# Patient Record
Sex: FEMALE | Race: OTHER | NOT HISPANIC OR LATINO
[De-identification: names, ages, dates, MRNs, and addresses within clinical notes are randomized per-mention and may not be internally consistent; named-entity substitution may affect disease eponyms.]

---

## 2021-01-06 ENCOUNTER — RESULT REVIEW (OUTPATIENT)
Age: 61
End: 2021-01-06

## 2021-02-08 ENCOUNTER — OUTPATIENT (OUTPATIENT)
Dept: OUTPATIENT SERVICES | Facility: HOSPITAL | Age: 61
LOS: 1 days | End: 2021-02-08
Payer: COMMERCIAL

## 2021-02-08 VITALS
SYSTOLIC BLOOD PRESSURE: 155 MMHG | TEMPERATURE: 98 F | RESPIRATION RATE: 16 BRPM | HEIGHT: 65 IN | HEART RATE: 67 BPM | DIASTOLIC BLOOD PRESSURE: 95 MMHG | OXYGEN SATURATION: 96 % | WEIGHT: 154.98 LBS

## 2021-02-08 DIAGNOSIS — Z01.818 ENCOUNTER FOR OTHER PREPROCEDURAL EXAMINATION: ICD-10-CM

## 2021-02-08 DIAGNOSIS — I10 ESSENTIAL (PRIMARY) HYPERTENSION: ICD-10-CM

## 2021-02-08 DIAGNOSIS — D24.1 BENIGN NEOPLASM OF RIGHT BREAST: ICD-10-CM

## 2021-02-08 PROCEDURE — G0463: CPT

## 2021-02-08 NOTE — H&P PST ADULT - HISTORY OF PRESENT ILLNESS
60year old female with pmhx of hypertension presents today for presurgical testing for scheduled excision of right breast mass with needle localization on 2/17/2021.

## 2021-02-08 NOTE — H&P PST ADULT - NSANTHOSAYNRD_GEN_A_CORE
No. CRISTY screening performed.  STOP BANG Legend: 0-2 = LOW Risk; 3-4 = INTERMEDIATE Risk; 5-8 = HIGH Risk

## 2021-02-13 DIAGNOSIS — Z01.818 ENCOUNTER FOR OTHER PREPROCEDURAL EXAMINATION: ICD-10-CM

## 2021-02-13 PROBLEM — Z00.00 ENCOUNTER FOR PREVENTIVE HEALTH EXAMINATION: Status: ACTIVE | Noted: 2021-02-13

## 2021-02-14 ENCOUNTER — APPOINTMENT (OUTPATIENT)
Dept: DISASTER EMERGENCY | Facility: CLINIC | Age: 61
End: 2021-02-14

## 2021-02-15 LAB — SARS-COV-2 N GENE NPH QL NAA+PROBE: NOT DETECTED

## 2021-02-16 ENCOUNTER — TRANSCRIPTION ENCOUNTER (OUTPATIENT)
Age: 61
End: 2021-02-16

## 2021-02-17 ENCOUNTER — RESULT REVIEW (OUTPATIENT)
Age: 61
End: 2021-02-17

## 2021-02-17 ENCOUNTER — OUTPATIENT (OUTPATIENT)
Dept: OUTPATIENT SERVICES | Facility: HOSPITAL | Age: 61
LOS: 1 days | End: 2021-02-17
Payer: COMMERCIAL

## 2021-02-17 VITALS
HEART RATE: 63 BPM | SYSTOLIC BLOOD PRESSURE: 165 MMHG | OXYGEN SATURATION: 100 % | WEIGHT: 154.98 LBS | RESPIRATION RATE: 16 BRPM | TEMPERATURE: 98 F | DIASTOLIC BLOOD PRESSURE: 85 MMHG | HEIGHT: 65 IN

## 2021-02-17 VITALS
DIASTOLIC BLOOD PRESSURE: 72 MMHG | TEMPERATURE: 98 F | RESPIRATION RATE: 14 BRPM | OXYGEN SATURATION: 98 % | SYSTOLIC BLOOD PRESSURE: 136 MMHG | HEART RATE: 59 BPM

## 2021-02-17 DIAGNOSIS — D24.1 BENIGN NEOPLASM OF RIGHT BREAST: ICD-10-CM

## 2021-02-17 PROCEDURE — 88305 TISSUE EXAM BY PATHOLOGIST: CPT | Mod: 26

## 2021-02-17 PROCEDURE — 19281 PERQ DEVICE BREAST 1ST IMAG: CPT | Mod: RT

## 2021-02-17 PROCEDURE — 19281 PERQ DEVICE BREAST 1ST IMAG: CPT

## 2021-02-17 PROCEDURE — 76098 X-RAY EXAM SURGICAL SPECIMEN: CPT

## 2021-02-17 PROCEDURE — 19125 EXCISION BREAST LESION: CPT | Mod: RT

## 2021-02-17 PROCEDURE — 88305 TISSUE EXAM BY PATHOLOGIST: CPT

## 2021-02-17 PROCEDURE — 76098 X-RAY EXAM SURGICAL SPECIMEN: CPT | Mod: 26

## 2021-02-17 RX ORDER — FENTANYL CITRATE 50 UG/ML
5 INJECTION INTRAVENOUS
Qty: 0 | Refills: 0 | DISCHARGE
Start: 2021-02-17

## 2021-02-17 RX ORDER — METOPROLOL TARTRATE 50 MG
1 TABLET ORAL
Qty: 0 | Refills: 0 | DISCHARGE

## 2021-02-17 RX ORDER — ACETAMINOPHEN 500 MG
1000 TABLET ORAL ONCE
Refills: 0 | Status: DISCONTINUED | OUTPATIENT
Start: 2021-02-17 | End: 2021-02-17

## 2021-02-17 RX ORDER — OXYCODONE AND ACETAMINOPHEN 5; 325 MG/1; MG/1
1 TABLET ORAL ONCE
Refills: 0 | Status: DISCONTINUED | OUTPATIENT
Start: 2021-02-17 | End: 2021-02-17

## 2021-02-17 RX ORDER — AMLODIPINE BESYLATE 2.5 MG/1
1 TABLET ORAL
Qty: 0 | Refills: 0 | DISCHARGE

## 2021-02-17 RX ORDER — FENTANYL CITRATE 50 UG/ML
1 INJECTION INTRAVENOUS
Qty: 0 | Refills: 0 | DISCHARGE
Start: 2021-02-17

## 2021-02-17 RX ORDER — SODIUM CHLORIDE 9 MG/ML
3 INJECTION INTRAMUSCULAR; INTRAVENOUS; SUBCUTANEOUS EVERY 8 HOURS
Refills: 0 | Status: DISCONTINUED | OUTPATIENT
Start: 2021-02-17 | End: 2021-02-17

## 2021-02-17 RX ORDER — FENTANYL CITRATE 50 UG/ML
25 INJECTION INTRAVENOUS
Refills: 0 | Status: DISCONTINUED | OUTPATIENT
Start: 2021-02-17 | End: 2021-02-17

## 2021-02-17 RX ORDER — FENTANYL CITRATE 50 UG/ML
50 INJECTION INTRAVENOUS
Refills: 0 | Status: DISCONTINUED | OUTPATIENT
Start: 2021-02-17 | End: 2021-02-17

## 2021-02-17 RX ORDER — LOSARTAN POTASSIUM 100 MG/1
1 TABLET, FILM COATED ORAL
Qty: 0 | Refills: 0 | DISCHARGE

## 2021-02-17 RX ADMIN — SODIUM CHLORIDE 3 MILLILITER(S): 9 INJECTION INTRAMUSCULAR; INTRAVENOUS; SUBCUTANEOUS at 09:31

## 2021-02-17 NOTE — ASU PREOP CHECKLIST - SELECT TESTS ORDERED
BMP/CBC/PT/PTT/EKG/Results in MD note/COVID COVID negative 2/14/2021/BMP/CBC/PT/PTT/EKG/Results in MD note/COVID

## 2021-02-17 NOTE — ASU PREOP CHECKLIST - PATIENT'S PERSONAL PROPERTY REMOVED
glasses right wrist bracelet tapped preop---"not removable"--OR MARI Manuel made aware preop./glasses/jewelry/body piercing

## 2021-02-17 NOTE — ASU DISCHARGE PLAN (ADULT/PEDIATRIC) - CARE PROVIDER_API CALL
Marlo Hutchison  COLON/RECTAL SURGERY  [address] [place] [address]  Phone: [phone]  Fax: [phone]  Follow Up Time:

## 2021-02-22 LAB — SURGICAL PATHOLOGY STUDY: SIGNIFICANT CHANGE UP

## 2022-06-08 NOTE — H&P PST ADULT - MALLAMPATI CLASS
Never Class I (easy) - visualization of the soft palate, fauces, uvula, and both anterior and posterior pillars

## 2023-03-25 NOTE — ASU DISCHARGE PLAN (ADULT/PEDIATRIC) - COMMENTS
A guidewire was exchanged for a (GUIDEWIRE WHOLEY 300CM STRGT .035IN VASC PERIPH FLPY TIP) guidewire. Please call to confirm appointment

## 2023-07-01 PROBLEM — I10 ESSENTIAL (PRIMARY) HYPERTENSION: Chronic | Status: ACTIVE | Noted: 2021-02-08

## 2023-07-21 ENCOUNTER — APPOINTMENT (OUTPATIENT)
Dept: ORTHOPEDIC SURGERY | Facility: CLINIC | Age: 63
End: 2023-07-21
Payer: COMMERCIAL

## 2023-07-21 VITALS — WEIGHT: 150 LBS | HEIGHT: 64 IN | BODY MASS INDEX: 25.61 KG/M2

## 2023-07-21 DIAGNOSIS — Z78.9 OTHER SPECIFIED HEALTH STATUS: ICD-10-CM

## 2023-07-21 DIAGNOSIS — I10 ESSENTIAL (PRIMARY) HYPERTENSION: ICD-10-CM

## 2023-07-21 PROCEDURE — 99204 OFFICE O/P NEW MOD 45 MIN: CPT

## 2023-07-21 NOTE — PHYSICAL EXAM
[Left] : left foot and ankle [2+] : dorsalis pedis pulse: 2+ [Right] : right foot and ankle [NL (20)] : dorsiflexion 20 degrees [NL (40)] : plantar flexion 40 degrees [5___] : inversion 5[unfilled]/5 [4___] : eversion 4[unfilled]/5 [] : no achilles tendon insertion tenderness

## 2023-07-21 NOTE — HISTORY OF PRESENT ILLNESS
[7] : 7 [0] : 0 [Dull/Aching] : dull/aching [de-identified] : 7/21/23: B/L ankle pain L>R for years. She denies injury.  L heel pain and medial foot pain occurrs with walking and can shoot up the leg. There is right lateral foot pain. Has had CSI and orthotics without relief. She trialed PT without relief. She saw Dr Kidd where MRI's ordered. [] : no [FreeTextEntry1] : B/L Feet  [FreeTextEntry5] : Pt is c/o many years of atraumatic bilateral feet pain L>R Pt had MRI of Both ankle s@ [facility] as per PCP

## 2023-09-08 ENCOUNTER — APPOINTMENT (OUTPATIENT)
Dept: ORTHOPEDIC SURGERY | Facility: CLINIC | Age: 63
End: 2023-09-08
Payer: COMMERCIAL

## 2023-09-08 VITALS — HEIGHT: 64 IN | BODY MASS INDEX: 25.61 KG/M2 | WEIGHT: 150 LBS

## 2023-09-08 DIAGNOSIS — S86.311A STRAIN OF MUSCLE(S) AND TENDON(S) OF PERONEAL MUSCLE GROUP AT LOWER LEG LEVEL, RIGHT LEG, INITIAL ENCOUNTER: ICD-10-CM

## 2023-09-08 DIAGNOSIS — M72.2 PLANTAR FASCIAL FIBROMATOSIS: ICD-10-CM

## 2023-09-08 PROCEDURE — 99213 OFFICE O/P EST LOW 20 MIN: CPT

## 2023-09-08 NOTE — PHYSICAL EXAM
[Left] : left foot and ankle [2+] : dorsalis pedis pulse: 2+ [Right] : right foot and ankle [NL (20)] : dorsiflexion 20 degrees [NL (40)] : plantar flexion 40 degrees [5___] : inversion 5[unfilled]/5 [4___] : eversion 4[unfilled]/5 [] : no achilles tendon insertion tenderness [FreeTextEntry8] : minimal

## 2023-09-08 NOTE — HISTORY OF PRESENT ILLNESS
[7] : 7 [0] : 0 [Dull/Aching] : dull/aching [de-identified] : 7/21/23: B/L ankle pain L>R for years. She denies injury.  L heel pain and medial foot pain occurs with walking and can shoot up the leg. There is right lateral foot pain. Has had CSI and orthotics without relief. She trialed PT without relief. She saw Dr Kidd where MRI's ordered. 9/8/23  f/u alaina foot pain 50-60% improved  HEP helpful [] : no [FreeTextEntry1] : B/L Feet  [FreeTextEntry5] : Pt is c/o many years of atraumatic bilateral feet pain L>R Pt had MRI of Both ankle s@ [facility] as per PCP. Pt states condition has slightly improved since last visit

## 2024-05-16 NOTE — DATA REVIEWED
MRI] : MRI [Ankle] : ankle [I independently reviewed and interpreted images and report] : I independently reviewed and interpreted images and report [I reviewed the films/CD and agree] : I reviewed the films/CD and agree [Left] : left [FreeTextEntry1] : 6/8/23: .  Chronic moderate grade CFL sprain. Mild scar remodeling of the deep deltoid ligament bundle.\par 2.  9 mm chronic osteochondral lesion of the medial talar dome with moderate grade articular cartilage loss. No evidence of lesion instability. Minimal ankle effusion.\par 3.  Mild to moderate multifocal midfoot osteoarthritis.\par 4.  1.7 cm fusiform thickening of the middle cord of the plantar fascia at the origin, compatible with scarring related to prior fasciopathy versus a plantar fibroma. Mild active plantar fasciitis.\par 5.  Mild Achilles tendon enthesopathy. Mild peroneal and tibialis posterior tenosynovitis. [FreeTextEntry2] : 6/8/23: 1.  Chronic longitudinal split tear of the peroneus brevis tendon as described. Mild peroneal tenosynovitis.\par 2.  Moderate midfoot osteoarthritis.\par 3.  Mild scar thickening and waviness of the ATFL compatible with laxity status post prior healed injury.\par 4.  4 mm chronic osteochondral lesion of the medial talar dome. No evidence of lesion instability.\par 5.  Small posterior subtalar joint effusion.\par 6.  Evidence of prior plantar fasciopathy. Mild reactive marrow edema within a small plantar calcaneal enthesophyte may reflect low-grade active fasciopathy.\par 7.  Mild tibialis posterior tenosynovitis. Mild Achilles tendinosis and enthesopathy.\par 8.  Apparent metatarsus adductus. Correlation with weightbearing radiographs of the foot recommended.

## 2025-02-14 NOTE — BRIEF OPERATIVE NOTE - OPERATION/FINDINGS
ducts/cysts filled with dark brown material. Clip and wire removed and confirmed in specimen negative Started first trimester